# Patient Record
Sex: FEMALE | Race: BLACK OR AFRICAN AMERICAN | NOT HISPANIC OR LATINO | Employment: UNEMPLOYED | URBAN - METROPOLITAN AREA
[De-identification: names, ages, dates, MRNs, and addresses within clinical notes are randomized per-mention and may not be internally consistent; named-entity substitution may affect disease eponyms.]

---

## 2021-05-13 ENCOUNTER — ANNUAL EXAM (OUTPATIENT)
Dept: OBGYN CLINIC | Facility: CLINIC | Age: 41
End: 2021-05-13
Payer: COMMERCIAL

## 2021-05-13 VITALS
HEIGHT: 62 IN | DIASTOLIC BLOOD PRESSURE: 70 MMHG | BODY MASS INDEX: 31.1 KG/M2 | SYSTOLIC BLOOD PRESSURE: 110 MMHG | WEIGHT: 169 LBS

## 2021-05-13 DIAGNOSIS — Z01.411 ENCNTR FOR GYN EXAM (GENERAL) (ROUTINE) W ABNORMAL FINDINGS: Primary | ICD-10-CM

## 2021-05-13 DIAGNOSIS — N92.0 MENORRHAGIA WITH REGULAR CYCLE: ICD-10-CM

## 2021-05-13 DIAGNOSIS — D50.0 IRON DEFICIENCY ANEMIA DUE TO CHRONIC BLOOD LOSS: ICD-10-CM

## 2021-05-13 DIAGNOSIS — Z12.31 ENCOUNTER FOR SCREENING MAMMOGRAM FOR MALIGNANT NEOPLASM OF BREAST: ICD-10-CM

## 2021-05-13 DIAGNOSIS — Z12.4 SCREENING FOR MALIGNANT NEOPLASM OF THE CERVIX: ICD-10-CM

## 2021-05-13 PROCEDURE — 99396 PREV VISIT EST AGE 40-64: CPT | Performed by: OBSTETRICS & GYNECOLOGY

## 2021-05-13 RX ORDER — DIPHENOXYLATE HYDROCHLORIDE AND ATROPINE SULFATE 2.5; .025 MG/1; MG/1
1 TABLET ORAL DAILY
COMMUNITY

## 2021-05-13 RX ORDER — IRON,CARBONYL/ASCORBIC ACID 100-250 MG
TABLET ORAL
COMMUNITY

## 2021-05-13 NOTE — PROGRESS NOTES
Assessment/Plan:     Diagnoses and all orders for this visit:    Encntr for gyn exam (general) (routine) w abnormal findings    Encounter for screening mammogram for malignant neoplasm of breast  -     Mammo screening bilateral w 3d & cad; Future    Menorrhagia with regular cycle  Management with Motrin, Lysteda or contraceptives reviewed  Risks, benefits and indications reviewed  Pt considering Mirena or Christobal Mar  Insertion procedure, R&B and indications reviewed  Iron deficiency anemia due to chronic blood loss  Pt to continue with P O  iron that was Rx'd by her PCP  Other orders  -     Iron-Vitamin C 100-250 MG TABS; Take by mouth  -     multivitamin (THERAGRAN) TABS; Take 1 tablet by mouth daily          Subjective:      Patient ID: Fransisco Labrum is a 36 y o  female  Pt presents for GYN exam and to discuss management of her menorrhagia  Pt not seen by GYN since 2019  Per pt, she was evaluated by PCP few months ago and was informed of being anemic  Pt was advised to start P O  iron  The following portions of the patient's history were reviewed and updated as appropriate: allergies, current medications, past family history, past medical history, past social history, past surgical history and problem list     Review of Systems   Constitutional: Negative  Negative for fatigue  Respiratory: Negative for cough, chest tightness and shortness of breath  Cardiovascular: Negative for chest pain and palpitations  Genitourinary: Negative for dyspareunia, frequency, pelvic pain and vaginal pain  Hematological: Does not bruise/bleed easily  Objective:      /70   Ht 5' 2" (1 575 m)   Wt 76 7 kg (169 lb)   LMP 04/22/2021   BMI 30 91 kg/m²          Physical Exam  Vitals signs and nursing note reviewed  HENT:      Head: Normocephalic  Chest:      Breasts: Breasts are symmetrical          Right: Normal  No bleeding, mass, nipple discharge, skin change or tenderness  Left: Normal  No bleeding, mass, nipple discharge, skin change or tenderness  Abdominal:      General: There is no distension  Palpations: Abdomen is soft  There is no mass  Tenderness: There is no abdominal tenderness  There is no rebound  Genitourinary:     General: Normal vulva  Exam position: Lithotomy position  Labia:         Right: No rash, tenderness or lesion  Left: No rash, tenderness or lesion  Urethra: No urethral pain or urethral lesion  Vagina: Normal  No vaginal discharge  Cervix: No discharge, friability, lesion or erythema  Uterus: Normal        Adnexa: Right adnexa normal and left adnexa normal       Rectum: No external hemorrhoid  Musculoskeletal:      Right lower leg: No edema  Left lower leg: No edema  Lymphadenopathy:      Upper Body:      Right upper body: No axillary or pectoral adenopathy  Left upper body: No axillary or pectoral adenopathy  Skin:     General: Skin is warm  Neurological:      Mental Status: She is alert and oriented to person, place, and time  Psychiatric:         Mood and Affect: Mood normal          Behavior: Behavior normal          Thought Content:  Thought content normal

## 2021-05-14 ENCOUNTER — TELEPHONE (OUTPATIENT)
Dept: OBGYN CLINIC | Facility: CLINIC | Age: 41
End: 2021-05-14

## 2021-05-14 PROBLEM — D50.0 IRON DEFICIENCY ANEMIA DUE TO CHRONIC BLOOD LOSS: Status: ACTIVE | Noted: 2021-05-14

## 2021-05-14 NOTE — TELEPHONE ENCOUNTER
Mirena IUD / Miriwood Brood IUD Insert -- per rep Danis Taylor at [facility], patient is covered 100%, no copay, no deductible  Reference #[id number]

## 2021-05-14 NOTE — TELEPHONE ENCOUNTER
----- Message from Dileep Romero DO sent at 5/13/2021 10:37 AM EDT -----  Regarding: Mirena or Liletta  Please check coverage for Mirena for management of menorrhagia  If Mirena not covered, then check for Liletta  Thank you

## 2021-05-17 LAB
CYTOLOGIST CVX/VAG CYTO: NORMAL
DX ICD CODE: NORMAL
HPV I/H RISK 4 DNA CVX QL PROBE+SIG AMP: NEGATIVE
OTHER STN SPEC: NORMAL
PATH REPORT.FINAL DX SPEC: NORMAL
SL AMB NOTE:: NORMAL
SL AMB SPECIMEN ADEQUACY: NORMAL
SL AMB TEST METHODOLOGY: NORMAL

## 2022-08-15 ENCOUNTER — ANNUAL EXAM (OUTPATIENT)
Dept: OBGYN CLINIC | Facility: CLINIC | Age: 42
End: 2022-08-15

## 2022-08-15 VITALS
DIASTOLIC BLOOD PRESSURE: 80 MMHG | WEIGHT: 197 LBS | BODY MASS INDEX: 36.25 KG/M2 | SYSTOLIC BLOOD PRESSURE: 130 MMHG | HEIGHT: 62 IN

## 2022-08-15 DIAGNOSIS — N92.0 MENORRHAGIA WITH REGULAR CYCLE: ICD-10-CM

## 2022-08-15 DIAGNOSIS — Z12.31 ENCOUNTER FOR SCREENING MAMMOGRAM FOR MALIGNANT NEOPLASM OF BREAST: ICD-10-CM

## 2022-08-15 DIAGNOSIS — Z01.411 ENCNTR FOR GYN EXAM (GENERAL) (ROUTINE) W ABNORMAL FINDINGS: Primary | ICD-10-CM

## 2022-08-15 DIAGNOSIS — N94.10 DYSPAREUNIA IN FEMALE: ICD-10-CM

## 2022-08-17 ENCOUNTER — TELEPHONE (OUTPATIENT)
Dept: OBGYN CLINIC | Facility: CLINIC | Age: 42
End: 2022-08-17

## 2022-08-17 NOTE — TELEPHONE ENCOUNTER
Spoke with Meche at [facility] and provided ICD 10 codes for TV/pelvic US as listed on orders:   N94 10 dyspareunia in female    N92 0 menorrhagia with regular cycle

## 2022-08-30 DIAGNOSIS — Z12.31 ENCOUNTER FOR SCREENING MAMMOGRAM FOR MALIGNANT NEOPLASM OF BREAST: ICD-10-CM

## 2022-09-15 NOTE — PROGRESS NOTES
Annual Wellness Visit  [address], [address], [place] [address] [facility]    ASSESSMENT/PLAN: Gabriella George is a 39 y o   who presents for annual gynecologic exam     Encounter for routine gynecologic examination  - Routine well woman exam completed today  - Cervical Cancer Screening: Current ASCCP Guidelines reviewed  Last Pap: 2021  Next Pap Due: , routine   - STI screening offered including HIV testing: offered, pt declined  - Contraceptive counseling discussed  Current contraception:  S/p b/l T L   - Breast Cancer Screening: Last Mammogram 2022  - The following were reviewed in today's visit: breast self exam    Additional problems addressed during this visit:  1  Encntr for gyn exam (general) (routine) w abnormal findings    2  Encounter for screening mammogram for malignant neoplasm of breast  -     Mammo screening bilateral w 3d & cad; Future    3  Dyspareunia in female  - Unremarkable findings on pelvic exam   Pelvic U/S ordered to further evaluate  -     US pelvis complete w transvaginal; Future; Expected date: 08/15/2022    4  Menorrhagia with regular cycle  -   Normal size uterus on exam   Pelvic U/S ordered to further evaluate  -  Pt to RTO to review U/S results and discuss management options for menorrhagia  -     US pelvis complete w transvaginal; Future; Expected date: 08/15/2022    Next visit: 1 yr for WA, 2 wks after Pelvic U/S    CC:  Annual Gynecologic Examination    HPI: Gabriella George is a 39 y o   who presents for annual gynecologic examination  Patient presents for yearly Gyn exam   Patient states she has regular menses with mod-heavy flow  Patient desires Endometrial ablation, stating one of her family member stopped having menstrual bleeding after Endometrial ablation  Patient has dyspareunia    Concerned that it could be ruptured ovarian cyst due having h/o ruptured ovarian cyst in the past   Family history significant for uterine myomas      Gyn History  Patient's last menstrual period was 2022  Last Pap: 2021 was normal    She  reports being sexually active and has had partner(s) who are male  She reports using the following method of birth control/protection: Female Sterilization  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  No date: Ovarian cyst  No date: Pain in female genitalia on intercourse     Past Surgical History:  No date:  SECTION      Comment:  X2  No date: GYNECOLOGIC CRYOSURGERY  No date: TUBAL LIGATION     Family History   Problem Relation Age of Onset    No Known Problems Sister     No Known Problems Brother     No Known Problems Son     Multiple sclerosis Daughter     Breast cancer Maternal Aunt     No Known Problems Brother     No Known Problems Daughter     Uterine cancer Neg Hx     Ovarian cancer Neg Hx     Colon cancer Neg Hx         Social History     Tobacco Use    Smoking status: Never Smoker    Smokeless tobacco: Never Used   Vaping Use    Vaping Use: Never used   Substance Use Topics    Alcohol use: Yes    Drug use: Never     Comment: no          Current Outpatient Medications:     Iron-Vitamin C 100-250 MG TABS, Take by mouth, Disp: , Rfl:     Misc Natural Products (HOT FLASHEX PO), Take by mouth, Disp: , Rfl:     multivitamin (THERAGRAN) TABS, Take 1 tablet by mouth daily, Disp: , Rfl:     She has No Known Allergies       ROS negative except as noted in HPI    Objective:  /80 (BP Location: Right arm, Patient Position: Sitting, Cuff Size: Standard)   Ht 5' 2" (1 575 m)   Wt 89 4 kg (197 lb)   LMP 2022   BMI 36 03 kg/m²      Physical Exam  Vitals and nursing note reviewed  HENT:      Head: Normocephalic  Chest:   Breasts: Breasts are symmetrical       Right: Normal  No bleeding, mass, nipple discharge, skin change, tenderness or axillary adenopathy        Left: Normal  No bleeding, mass, nipple discharge, skin change, tenderness or axillary adenopathy  Abdominal:      General: There is no distension  Palpations: Abdomen is soft  There is no mass  Tenderness: There is no abdominal tenderness  There is no rebound  Genitourinary:     General: Normal vulva  Exam position: Lithotomy position  Labia:         Right: No rash, tenderness or lesion  Left: No rash, tenderness or lesion  Urethra: No urethral pain or urethral lesion  Vagina: Normal  No vaginal discharge  Cervix: No discharge, friability, lesion or erythema  Uterus: Normal        Adnexa: Right adnexa normal and left adnexa normal       Rectum: No external hemorrhoid  Musculoskeletal:      Right lower leg: No edema  Left lower leg: No edema  Lymphadenopathy:      Upper Body:      Right upper body: No axillary or pectoral adenopathy  Left upper body: No axillary or pectoral adenopathy  Skin:     General: Skin is warm  Neurological:      Mental Status: She is alert and oriented to person, place, and time  Psychiatric:         Mood and Affect: Mood normal          Behavior: Behavior normal          Thought Content:  Thought content normal

## 2022-09-19 ENCOUNTER — OFFICE VISIT (OUTPATIENT)
Dept: OBGYN CLINIC | Facility: CLINIC | Age: 42
End: 2022-09-19

## 2022-09-19 VITALS
WEIGHT: 194.6 LBS | HEIGHT: 62 IN | SYSTOLIC BLOOD PRESSURE: 132 MMHG | BODY MASS INDEX: 35.81 KG/M2 | DIASTOLIC BLOOD PRESSURE: 80 MMHG

## 2022-09-19 DIAGNOSIS — N92.0 MENORRHAGIA WITH REGULAR CYCLE: Primary | ICD-10-CM

## 2022-09-19 DIAGNOSIS — D25.0 SUBMUCOUS MYOMA OF UTERUS: ICD-10-CM

## 2022-09-19 RX ORDER — TRANEXAMIC ACID 650 MG/1
1300 TABLET ORAL 3 TIMES DAILY
Qty: 30 TABLET | Refills: 11 | Status: SHIPPED | OUTPATIENT
Start: 2022-09-19 | End: 2022-09-24

## 2022-09-20 NOTE — PROGRESS NOTES
Assessment/Plan:      Diagnoses and all orders for this visit:    Menorrhagia with regular cycle  -  Management of heavy menstrual flow with Motrin, Lysteda or hormonal options reviewed  Patient declined hormonal options  Desires to take Lysteda PRN  -     Tranexamic Acid 650 MG TABS; Take 2 tablets (1,300 mg total) by mouth 3 (three) times a day for 5 days    Submucous myoma of uterus         -  Surgical management of submucosal myoma recommended  Patient desires to observe menstrual flow with Lysteda prior to considering surgical management     -  Advised patient to RTO after taking Lysteda with next menses  Subjective:     Patient ID: Mckenna Johnson is a 39 y o  female  Patient presents to review U/S results  Patient with h/o menorrhagia x 2 years, states menses are regular with heavy flow lasting for 1-2 days  Patient with dysmenorrhea on days with heavy menstrual flow  Review of Systems   Constitutional: Negative for activity change and unexpected weight change  Genitourinary: Positive for menstrual problem  Negative for dyspareunia, frequency, pelvic pain, vaginal bleeding, vaginal discharge and vaginal pain           Objective:     Physical Exam

## 2022-10-26 ENCOUNTER — OFFICE VISIT (OUTPATIENT)
Dept: OBGYN CLINIC | Facility: CLINIC | Age: 42
End: 2022-10-26

## 2022-10-26 VITALS
DIASTOLIC BLOOD PRESSURE: 80 MMHG | BODY MASS INDEX: 36.33 KG/M2 | WEIGHT: 197.4 LBS | HEIGHT: 62 IN | SYSTOLIC BLOOD PRESSURE: 128 MMHG

## 2022-10-26 DIAGNOSIS — D25.0 SUBMUCOUS MYOMA OF UTERUS: Primary | ICD-10-CM

## 2022-10-26 DIAGNOSIS — N92.0 MENORRHAGIA WITH REGULAR CYCLE: ICD-10-CM

## 2022-10-28 ENCOUNTER — APPOINTMENT (OUTPATIENT)
Dept: LAB | Facility: HOSPITAL | Age: 42
End: 2022-10-28
Attending: OBSTETRICS & GYNECOLOGY
Payer: COMMERCIAL

## 2022-10-28 DIAGNOSIS — Z01.818 ENCOUNTER FOR PREADMISSION TESTING: ICD-10-CM

## 2022-10-28 LAB
ABO GROUP BLD: NORMAL
ANION GAP SERPL CALCULATED.3IONS-SCNC: 5 MMOL/L (ref 4–13)
BACTERIA UR QL AUTO: ABNORMAL /HPF
BASOPHILS # BLD AUTO: 0.03 THOUSANDS/ÂΜL (ref 0–0.1)
BASOPHILS NFR BLD AUTO: 0 % (ref 0–1)
BILIRUB UR QL STRIP: NEGATIVE
BLD GP AB SCN SERPL QL: NEGATIVE
BUN SERPL-MCNC: 11 MG/DL (ref 5–25)
CALCIUM SERPL-MCNC: 8.9 MG/DL (ref 8.3–10.1)
CHLORIDE SERPL-SCNC: 105 MMOL/L (ref 96–108)
CLARITY UR: CLEAR
CO2 SERPL-SCNC: 27 MMOL/L (ref 21–32)
COLOR UR: YELLOW
CREAT SERPL-MCNC: 0.94 MG/DL (ref 0.6–1.3)
EOSINOPHIL # BLD AUTO: 0.18 THOUSAND/ÂΜL (ref 0–0.61)
EOSINOPHIL NFR BLD AUTO: 2 % (ref 0–6)
ERYTHROCYTE [DISTWIDTH] IN BLOOD BY AUTOMATED COUNT: 12.5 % (ref 11.6–15.1)
GFR SERPL CREATININE-BSD FRML MDRD: 75 ML/MIN/1.73SQ M
GLUCOSE P FAST SERPL-MCNC: 68 MG/DL (ref 65–99)
GLUCOSE UR STRIP-MCNC: NEGATIVE MG/DL
HCT VFR BLD AUTO: 44 % (ref 34.8–46.1)
HGB BLD-MCNC: 13.9 G/DL (ref 11.5–15.4)
HGB UR QL STRIP.AUTO: NEGATIVE
IMM GRANULOCYTES # BLD AUTO: 0.03 THOUSAND/UL (ref 0–0.2)
IMM GRANULOCYTES NFR BLD AUTO: 0 % (ref 0–2)
KETONES UR STRIP-MCNC: NEGATIVE MG/DL
LEUKOCYTE ESTERASE UR QL STRIP: NEGATIVE
LYMPHOCYTES # BLD AUTO: 2.43 THOUSANDS/ÂΜL (ref 0.6–4.47)
LYMPHOCYTES NFR BLD AUTO: 24 % (ref 14–44)
MCH RBC QN AUTO: 28.6 PG (ref 26.8–34.3)
MCHC RBC AUTO-ENTMCNC: 31.6 G/DL (ref 31.4–37.4)
MCV RBC AUTO: 91 FL (ref 82–98)
MONOCYTES # BLD AUTO: 0.71 THOUSAND/ÂΜL (ref 0.17–1.22)
MONOCYTES NFR BLD AUTO: 7 % (ref 4–12)
MUCOUS THREADS UR QL AUTO: ABNORMAL
NEUTROPHILS # BLD AUTO: 6.59 THOUSANDS/ÂΜL (ref 1.85–7.62)
NEUTS SEG NFR BLD AUTO: 67 % (ref 43–75)
NITRITE UR QL STRIP: NEGATIVE
NON-SQ EPI CELLS URNS QL MICRO: ABNORMAL /HPF
NRBC BLD AUTO-RTO: 0 /100 WBCS
PH UR STRIP.AUTO: 7.5 [PH]
PLATELET # BLD AUTO: 235 THOUSANDS/UL (ref 149–390)
PMV BLD AUTO: 11.9 FL (ref 8.9–12.7)
POTASSIUM SERPL-SCNC: 4 MMOL/L (ref 3.5–5.3)
PROT UR STRIP-MCNC: NEGATIVE MG/DL
RBC # BLD AUTO: 4.86 MILLION/UL (ref 3.81–5.12)
RBC #/AREA URNS AUTO: ABNORMAL /HPF
RH BLD: POSITIVE
SODIUM SERPL-SCNC: 137 MMOL/L (ref 135–147)
SP GR UR STRIP.AUTO: 1.01 (ref 1–1.03)
SPECIMEN EXPIRATION DATE: NORMAL
UROBILINOGEN UR STRIP-ACNC: <2 MG/DL
WBC # BLD AUTO: 9.97 THOUSAND/UL (ref 4.31–10.16)
WBC #/AREA URNS AUTO: ABNORMAL /HPF

## 2022-10-28 PROCEDURE — 81001 URINALYSIS AUTO W/SCOPE: CPT

## 2022-10-28 PROCEDURE — 86900 BLOOD TYPING SEROLOGIC ABO: CPT

## 2022-10-28 PROCEDURE — 80048 BASIC METABOLIC PNL TOTAL CA: CPT

## 2022-10-28 PROCEDURE — 85025 COMPLETE CBC W/AUTO DIFF WBC: CPT

## 2022-10-28 PROCEDURE — 86850 RBC ANTIBODY SCREEN: CPT

## 2022-10-28 PROCEDURE — 86901 BLOOD TYPING SEROLOGIC RH(D): CPT

## 2022-10-28 PROCEDURE — 36415 COLL VENOUS BLD VENIPUNCTURE: CPT

## 2022-10-28 PROCEDURE — 87086 URINE CULTURE/COLONY COUNT: CPT

## 2022-10-29 LAB — BACTERIA UR CULT: NORMAL

## 2022-11-02 RX ORDER — TRANEXAMIC ACID 650 MG/1
TABLET ORAL
COMMUNITY
Start: 2022-10-27

## 2022-11-02 NOTE — PRE-PROCEDURE INSTRUCTIONS
Pre-Surgery Instructions:   Medication Instructions   • Iron-Vitamin C 100-250 MG TABS Stop taking 3 days prior to surgery  • Misc Natural Products (HOT FLASHEX PO) Stop taking 3 days prior to surgery  • multivitamin (THERAGRAN) TABS Stop taking 3 days prior to surgery     • Tranexamic Acid 650 MG TABS does not take on DOS

## 2022-11-03 ENCOUNTER — ANESTHESIA EVENT (OUTPATIENT)
Dept: PERIOP | Facility: HOSPITAL | Age: 42
End: 2022-11-03

## 2022-11-03 NOTE — PROGRESS NOTES
History and Physical  [address]   [address], [address], [place] [facility] [address] [address]    Assessment/Plan:  1  Submucous myoma of uterus      -  management of submucosal myoma via D&C, hysteroscopy and myomectomy or hysterectomy reviewed  R&B, indications and procedure discussed  Patient declines to proceed with Hysterectomy  Desires to proceed with D&C, hysteroscopy an myomectomy via Symphion tissue removal system  H&P done and informed consent obtained    2  Menorrhagia with regular cycle      -  Patient to continue with Lysteda PRN     Subjective:   Joi Conrad is a 39 y o   female  Patient states her menstrual bleeding has significantly decreased with Lysteda but she desires surgical management of uterine fibroids and observe if menstrual bleeding decreases    CC: Desires surgery for fibroids    HPI:   Patient with history of menorrhagia with regular cycles  Declined management with OCPs or other hormonal options  Pelvic U/S significant for two submucosal myomas  Patient presents to discuss surgical management options for uterine fibroids  Patient states her menstrual bleeding has decreased wit Lysteda      Gyn History  Patient's last menstrual period was 10/12/2022 (exact date)  Last pap smear: 2021    She  reports being sexually active and has had partner(s) who are male  She reports using the following method of birth control/protection: Female Sterilization         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  No date: Ovarian cyst  No date: Pain in female genitalia on intercourse     Past Surgical History:  No date:  SECTION      Comment:  X2  No date: GYNECOLOGIC CRYOSURGERY  No date: TUBAL LIGATION     Social History     Tobacco Use   • Smoking status: Never Smoker   • Smokeless tobacco: Never Used   Vaping Use   • Vaping Use: Never used   Substance Use Topics   • Alcohol use: Yes     Comment: occasional   • Drug use: Never     Comment: no          Current Outpatient Medications:   •  Iron-Vitamin C 100-250 MG TABS, Take by mouth, Disp: , Rfl:   •  Misc Natural Products (HOT FLASHEX PO), Take by mouth, Disp: , Rfl:   •  multivitamin (THERAGRAN) TABS, Take 1 tablet by mouth daily, Disp: , Rfl:   •  Tranexamic Acid 650 MG TABS, , Disp: , Rfl:     She has No Known Allergies       ROS: Review of Systems   Constitutional: Negative for activity change and unexpected weight change  Genitourinary: Negative for difficulty urinating, dyspareunia, dysuria, menstrual problem, pelvic pain, vaginal bleeding, vaginal discharge and vaginal pain  Objective:  /80 (BP Location: Left arm, Patient Position: Sitting, Cuff Size: Standard)   Ht 5' 2" (1 575 m)   Wt 89 5 kg (197 lb 6 4 oz)   LMP 10/12/2022 (Exact Date)   BMI 36 10 kg/m²     Physical exam:  Gen: alert, no acute distress  Cards: regular rate  Resp: unlabored breathing  Abdomen: soft, non-tender, non-distended  Extremities: non-tender, no edema     Physical Exam  Vitals and nursing note reviewed  HENT:      Head: Normocephalic  Abdominal:      General: There is no distension  Palpations: Abdomen is soft  There is no mass  Tenderness: There is no abdominal tenderness  There is no rebound  Genitourinary:     General: Normal vulva  Exam position: Lithotomy position  Labia:         Right: No rash, tenderness or lesion  Left: No rash, tenderness or lesion  Urethra: No urethral pain or urethral lesion  Vagina: Normal  No vaginal discharge  Cervix: No cervical motion tenderness, lesion or erythema  Uterus: Normal        Adnexa: Right adnexa normal and left adnexa normal       Rectum: No external hemorrhoid  Musculoskeletal:      Right lower leg: No edema  Left lower leg: No edema  Skin:     General: Skin is warm     Neurological:      Mental Status: She is alert and oriented to person, place, and time  Psychiatric:         Mood and Affect: Mood normal          Behavior: Behavior normal          Thought Content:  Thought content normal

## 2022-11-03 NOTE — H&P (VIEW-ONLY)
History and Physical  [address]   [address], [address], [place] [address]    Assessment/Plan:  1  Submucous myoma of uterus      -  management of submucosal myoma via D&C, hysteroscopy and myomectomy or hysterectomy reviewed  R&B, indications and procedure discussed  Patient declines to proceed with Hysterectomy  Desires to proceed with D&C, hysteroscopy an myomectomy via Symphion tissue removal system  H&P done and informed consent obtained    2  Menorrhagia with regular cycle      -  Patient to continue with Lysteda PRN     Subjective:   Shahab Foy is a 39 y o   female  Patient states her menstrual bleeding has significantly decreased with Lysteda but she desires surgical management of uterine fibroids and observe if menstrual bleeding decreases    CC: Desires surgery for fibroids    HPI:   Patient with history of menorrhagia with regular cycles  Declined management with OCPs or other hormonal options  Pelvic U/S significant for two submucosal myomas  Patient presents to discuss surgical management options for uterine fibroids  Patient states her menstrual bleeding has decreased wit Lysteda      Gyn History  Patient's last menstrual period was 10/12/2022 (exact date)  Last pap smear: 2021    She  reports being sexually active and has had partner(s) who are male  She reports using the following method of birth control/protection: Female Sterilization         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  No date: Ovarian cyst  No date: Pain in female genitalia on intercourse     Past Surgical History:  No date:  SECTION      Comment:  X2  No date: GYNECOLOGIC CRYOSURGERY  No date: TUBAL LIGATION     Social History     Tobacco Use   • Smoking status: Never Smoker   • Smokeless tobacco: Never Used   Vaping Use   • Vaping Use: Never used   Substance Use Topics   • Alcohol use: Yes     Comment: occasional   • Drug use: Never     Comment: no          Current Outpatient Medications:   •  Iron-Vitamin C 100-250 MG TABS, Take by mouth, Disp: , Rfl:   •  Misc Natural Products (HOT FLASHEX PO), Take by mouth, Disp: , Rfl:   •  multivitamin (THERAGRAN) TABS, Take 1 tablet by mouth daily, Disp: , Rfl:   •  Tranexamic Acid 650 MG TABS, , Disp: , Rfl:     She has No Known Allergies       ROS: Review of Systems   Constitutional: Negative for activity change and unexpected weight change  Genitourinary: Negative for difficulty urinating, dyspareunia, dysuria, menstrual problem, pelvic pain, vaginal bleeding, vaginal discharge and vaginal pain  Objective:  /80 (BP Location: Left arm, Patient Position: Sitting, Cuff Size: Standard)   Ht 5' 2" (1 575 m)   Wt 89 5 kg (197 lb 6 4 oz)   LMP 10/12/2022 (Exact Date)   BMI 36 10 kg/m²     Physical exam:  Gen: alert, no acute distress  Cards: regular rate  Resp: unlabored breathing  Abdomen: soft, non-tender, non-distended  Extremities: non-tender, no edema     Physical Exam  Vitals and nursing note reviewed  HENT:      Head: Normocephalic  Abdominal:      General: There is no distension  Palpations: Abdomen is soft  There is no mass  Tenderness: There is no abdominal tenderness  There is no rebound  Genitourinary:     General: Normal vulva  Exam position: Lithotomy position  Labia:         Right: No rash, tenderness or lesion  Left: No rash, tenderness or lesion  Urethra: No urethral pain or urethral lesion  Vagina: Normal  No vaginal discharge  Cervix: No cervical motion tenderness, lesion or erythema  Uterus: Normal        Adnexa: Right adnexa normal and left adnexa normal       Rectum: No external hemorrhoid  Musculoskeletal:      Right lower leg: No edema  Left lower leg: No edema  Skin:     General: Skin is warm     Neurological:      Mental Status: She is alert and oriented to person, place, and time  Psychiatric:         Mood and Affect: Mood normal          Behavior: Behavior normal          Thought Content:  Thought content normal

## 2022-11-04 ENCOUNTER — HOSPITAL ENCOUNTER (OUTPATIENT)
Facility: HOSPITAL | Age: 42
Setting detail: OUTPATIENT SURGERY
Discharge: HOME | End: 2022-11-04
Attending: OBSTETRICS & GYNECOLOGY | Admitting: OBSTETRICS & GYNECOLOGY

## 2022-11-04 ENCOUNTER — ANESTHESIA (OUTPATIENT)
Dept: PERIOP | Facility: HOSPITAL | Age: 42
End: 2022-11-04

## 2022-11-04 VITALS
HEIGHT: 62 IN | DIASTOLIC BLOOD PRESSURE: 86 MMHG | RESPIRATION RATE: 18 BRPM | TEMPERATURE: 98 F | WEIGHT: 197 LBS | BODY MASS INDEX: 36.25 KG/M2 | OXYGEN SATURATION: 100 % | SYSTOLIC BLOOD PRESSURE: 143 MMHG | HEART RATE: 68 BPM

## 2022-11-04 DIAGNOSIS — D25.0 SUBMUCOUS MYOMA OF UTERUS: ICD-10-CM

## 2022-11-04 DIAGNOSIS — N92.1 MENORRHAGIA WITH IRREGULAR CYCLE: ICD-10-CM

## 2022-11-04 LAB
EXT PREGNANCY TEST URINE: NEGATIVE
EXT. CONTROL: NORMAL

## 2022-11-04 RX ORDER — FENTANYL CITRATE 50 UG/ML
INJECTION, SOLUTION INTRAMUSCULAR; INTRAVENOUS AS NEEDED
Status: DISCONTINUED | OUTPATIENT
Start: 2022-11-04 | End: 2022-11-04

## 2022-11-04 RX ORDER — PROPOFOL 10 MG/ML
INJECTION, EMULSION INTRAVENOUS AS NEEDED
Status: DISCONTINUED | OUTPATIENT
Start: 2022-11-04 | End: 2022-11-04

## 2022-11-04 RX ORDER — LIDOCAINE HYDROCHLORIDE 10 MG/ML
INJECTION, SOLUTION EPIDURAL; INFILTRATION; INTRACAUDAL; PERINEURAL AS NEEDED
Status: DISCONTINUED | OUTPATIENT
Start: 2022-11-04 | End: 2022-11-04

## 2022-11-04 RX ORDER — PROPOFOL 10 MG/ML
INJECTION, EMULSION INTRAVENOUS CONTINUOUS PRN
Status: DISCONTINUED | OUTPATIENT
Start: 2022-11-04 | End: 2022-11-04

## 2022-11-04 RX ORDER — ONDANSETRON 2 MG/ML
4 INJECTION INTRAMUSCULAR; INTRAVENOUS ONCE AS NEEDED
Status: DISCONTINUED | OUTPATIENT
Start: 2022-11-04 | End: 2022-11-04 | Stop reason: HOSPADM

## 2022-11-04 RX ORDER — KETOROLAC TROMETHAMINE 30 MG/ML
INJECTION, SOLUTION INTRAMUSCULAR; INTRAVENOUS AS NEEDED
Status: DISCONTINUED | OUTPATIENT
Start: 2022-11-04 | End: 2022-11-04

## 2022-11-04 RX ORDER — LIDOCAINE HYDROCHLORIDE 10 MG/ML
0.5 INJECTION, SOLUTION EPIDURAL; INFILTRATION; INTRACAUDAL; PERINEURAL ONCE AS NEEDED
Status: DISCONTINUED | OUTPATIENT
Start: 2022-11-04 | End: 2022-11-04 | Stop reason: HOSPADM

## 2022-11-04 RX ORDER — MIDAZOLAM HYDROCHLORIDE 2 MG/2ML
INJECTION, SOLUTION INTRAMUSCULAR; INTRAVENOUS AS NEEDED
Status: DISCONTINUED | OUTPATIENT
Start: 2022-11-04 | End: 2022-11-04

## 2022-11-04 RX ORDER — FENTANYL CITRATE/PF 50 MCG/ML
50 SYRINGE (ML) INJECTION
Status: DISCONTINUED | OUTPATIENT
Start: 2022-11-04 | End: 2022-11-04 | Stop reason: HOSPADM

## 2022-11-04 RX ORDER — METOCLOPRAMIDE HYDROCHLORIDE 5 MG/ML
10 INJECTION INTRAMUSCULAR; INTRAVENOUS ONCE AS NEEDED
Status: DISCONTINUED | OUTPATIENT
Start: 2022-11-04 | End: 2022-11-04 | Stop reason: HOSPADM

## 2022-11-04 RX ORDER — SODIUM CHLORIDE, SODIUM LACTATE, POTASSIUM CHLORIDE, CALCIUM CHLORIDE 600; 310; 30; 20 MG/100ML; MG/100ML; MG/100ML; MG/100ML
125 INJECTION, SOLUTION INTRAVENOUS CONTINUOUS
Status: DISCONTINUED | OUTPATIENT
Start: 2022-11-04 | End: 2022-11-04 | Stop reason: HOSPADM

## 2022-11-04 RX ORDER — LIDOCAINE HYDROCHLORIDE 10 MG/ML
INJECTION, SOLUTION EPIDURAL; INFILTRATION; INTRACAUDAL; PERINEURAL AS NEEDED
Status: DISCONTINUED | OUTPATIENT
Start: 2022-11-04 | End: 2022-11-04 | Stop reason: HOSPADM

## 2022-11-04 RX ADMIN — MIDAZOLAM 2 MG: 1 INJECTION INTRAMUSCULAR; INTRAVENOUS at 08:26

## 2022-11-04 RX ADMIN — FENTANYL CITRATE 25 MCG: 50 INJECTION, SOLUTION INTRAMUSCULAR; INTRAVENOUS at 08:47

## 2022-11-04 RX ADMIN — PROPOFOL 20 MG: 10 INJECTION, EMULSION INTRAVENOUS at 08:45

## 2022-11-04 RX ADMIN — SODIUM CHLORIDE, SODIUM LACTATE, POTASSIUM CHLORIDE, AND CALCIUM CHLORIDE: .6; .31; .03; .02 INJECTION, SOLUTION INTRAVENOUS at 08:28

## 2022-11-04 RX ADMIN — PROPOFOL 20 MG: 10 INJECTION, EMULSION INTRAVENOUS at 08:44

## 2022-11-04 RX ADMIN — KETOROLAC TROMETHAMINE 30 MG: 30 INJECTION, SOLUTION INTRAMUSCULAR; INTRAVENOUS at 08:58

## 2022-11-04 RX ADMIN — LIDOCAINE HYDROCHLORIDE 50 MG: 10 INJECTION, SOLUTION EPIDURAL; INFILTRATION; INTRACAUDAL; PERINEURAL at 08:34

## 2022-11-04 RX ADMIN — PROPOFOL 100 MCG/KG/MIN: 10 INJECTION, EMULSION INTRAVENOUS at 08:34

## 2022-11-04 RX ADMIN — FENTANYL CITRATE 25 MCG: 50 INJECTION, SOLUTION INTRAMUSCULAR; INTRAVENOUS at 08:42

## 2022-11-04 RX ADMIN — PROPOFOL 50 MG: 10 INJECTION, EMULSION INTRAVENOUS at 08:34

## 2022-11-04 NOTE — ANESTHESIA POSTPROCEDURE EVALUATION
Post-Op Assessment Note    CV Status:  Stable    Pain management: adequate     Mental Status:  Alert and awake   Hydration Status:  Euvolemic   PONV Controlled:  Controlled   Airway Patency:  Patent      Post Op Vitals Reviewed: Yes      Staff: CRNA         No complications documented      BP   108/61   Temp   98 8   Pulse  95   Resp   20   SpO2   100

## 2022-11-04 NOTE — OP NOTE
OPERATIVE REPORT  PATIENT NAME: Evan Killian    :  1980  MRN: [id number]  Pt Location: [facility] OR ROOM 03    SURGERY DATE: 2022    Surgeon(s) and Role:     DO Pretty Keyes Primary    Preop Diagnosis:  Submucous myoma of uterus [D25 0]  Menorrhagia with regular cycle [N92 1]    Post-Op Diagnosis Codes:     * Endometrial polyp     * Menorrhagia with regular cycle [N92 1]    Procedure(s) (LRB):  DILATATION AND CURETTAGE (D&C) WITH HYSTEROSCOPY (N/A) and polypectomy    Specimen(s):  Endometrial polyp  Endometrial curetting     Estimated Blood Loss:   Minimal    Drains:  none    Anesthesia Type:   IV Sedation with Anesthesia    Operative Indications:  Submucous myoma of uterus [D25 0]  Menorrhagia with regular cycle [N92 1]    Operative Findings:  Normal size uterus  B/L ostia visible  No submucosal myoma(s) noted  Normal appearing endometrium with one endometrial polyp noted     Complications:   None    Procedure and Technique:    The patient was taken to the operating room where anesthesia was administered  She was prepped and draped in the usual sterile fashion in the dorsal lithotomy position  A patient safety time-out was performed with all members of the operating room team present  Examination under anesthesia revealed findings as noted above  A bivalve speculum was placed to allow for good visualization of the cervix  A tenaculum was then placed on the anterior lip of the cervix  Paracervical block was obtained via 10 ml of 1% lidocaine  The uterus was sounded to a depth of 8 cm and then dilated to accommodate a #24 Michelle dilator  A hysteroscope was then introduced into the endocervical canal and crystalloid solution distending medium was turned on  The hysteroscope was advanced to the fundus and the diagnostic findings were as noted above  The hysteroscope was then removed and endometrial polyp was removed via polyp forceps    A thorough curettage of the endometrial canal was done to obtain endometrial curetting  Hysteroscope was reintroduced into the endometrium via endocervical canal and no endometrial polyp was noted  Upon completion, the tenaculum was removed from the anterior lip of the cervix and good hemostasis was noted  All instruments were removed from the vagina  Sponge, needle and instrument counts were correct  The patient tolerated the procedure well and was taken to the recovery room in stable condition      Patient Disposition:  PACU     SIGNATURE: Jaja Becker DO  DATE: November 4, 2022  TIME: 9:00 AM

## 2022-11-04 NOTE — ANESTHESIA PREPROCEDURE EVALUATION
Procedure:  DILATATION AND CURETTAGE (D&C) WITH HYSTEROSCOPY, MYOMECTOMY (N/A Uterus)  MYOMECTOMY (N/A Uterus)    Relevant Problems   HEMATOLOGY   (+) Iron deficiency anemia due to chronic blood loss        Physical Exam    Airway    Mallampati score: II  TM Distance: >3 FB  Neck ROM: full     Dental       Cardiovascular      Pulmonary      Other Findings        Anesthesia Plan  ASA Score- 2     Anesthesia Type- IV sedation with anesthesia with ASA Monitors  Additional Monitors:   Airway Plan:           Plan Factors-    Chart reviewed  Induction- intravenous  Postoperative Plan-     Informed Consent- Anesthetic plan and risks discussed with patient  I personally reviewed this patient with the CRNA  Discussed and agreed on the Anesthesia Plan with the CRNA  George Kimbrough

## 2022-11-04 NOTE — INTERVAL H&P NOTE
H&P reviewed  After examining the patient I find no changes in the patients condition since the H&P had been written      Vitals:    11/04/22 0733   BP: 128/86   Pulse: 78   Resp: 18   Temp: 98 8 °F (37 1 °C)   SpO2: 96%

## 2022-12-14 ENCOUNTER — OFFICE VISIT (OUTPATIENT)
Dept: OBGYN CLINIC | Facility: CLINIC | Age: 42
End: 2022-12-14

## 2022-12-14 VITALS
DIASTOLIC BLOOD PRESSURE: 74 MMHG | WEIGHT: 196.4 LBS | HEIGHT: 60 IN | SYSTOLIC BLOOD PRESSURE: 122 MMHG | BODY MASS INDEX: 38.56 KG/M2

## 2022-12-14 DIAGNOSIS — N92.0 MENORRHAGIA WITH REGULAR CYCLE: Primary | ICD-10-CM

## 2022-12-14 DIAGNOSIS — Z09 POSTOP CHECK: ICD-10-CM

## 2022-12-14 RX ORDER — TRANEXAMIC ACID 650 MG/1
1300 TABLET ORAL 3 TIMES DAILY
Qty: 30 TABLET | Refills: 11 | Status: SHIPPED | OUTPATIENT
Start: 2022-12-14 | End: 2022-12-19

## 2022-12-14 RX ORDER — IBUPROFEN 600 MG/1
600 TABLET ORAL EVERY 8 HOURS PRN
COMMUNITY
Start: 2022-09-22

## 2023-02-20 ENCOUNTER — PROCEDURE VISIT (OUTPATIENT)
Dept: OBGYN CLINIC | Facility: CLINIC | Age: 43
End: 2023-02-20

## 2023-02-20 VITALS
SYSTOLIC BLOOD PRESSURE: 128 MMHG | BODY MASS INDEX: 38.44 KG/M2 | DIASTOLIC BLOOD PRESSURE: 80 MMHG | WEIGHT: 195.8 LBS | HEIGHT: 60 IN

## 2023-02-20 DIAGNOSIS — Z01.812 PRE-PROCEDURE LAB EXAM: ICD-10-CM

## 2023-02-20 DIAGNOSIS — Z30.430 ENCOUNTER FOR INSERTION OF PROGESTIN-RELEASING INTRAUTERINE CONTRACEPTIVE DEVICE (IUD): Primary | ICD-10-CM

## 2023-02-20 LAB — SL AMB POCT URINE HCG: NEGATIVE

## 2023-02-20 NOTE — PATIENT INSTRUCTIONS
IUD inserted as requested  Spotty irregular bleeding may persist up to 6 months  Use backup method of birth control ×7 days  Always condom use for STI prevention  Return to office in 4 weeks after next menses  Call office with any bright red or excessive bleeding, fever, severe pelvic pain or foul discharge  Check string monthly after menses  May repeat motrin dose 4 hours from last dose with food

## 2023-02-20 NOTE — PROGRESS NOTES
Here for Mirena insertion for menorrhagia  Iud insertions    Date/Time: 2/20/2023 1:11 PM  Performed by: WEN Mcadams  Authorized by: WEN Mcadams     Other Assisting Provider: Yes (comment)    Verbal consent obtained?: Yes    Risks and benefits: Risks, benefits and alternatives were discussed    Consent given by:  Patient  Patient states understanding of procedure being performed: Yes    Patient identity confirmed:  Verbally with patient  Procedure:     Pelvic exam performed: yes      Negative urine pregnancy test: yes      Cervix cleaned and prepped: yes      Speculum placed in vagina: yes      Tenaculum applied to cervix: yes      Uterus sounded: yes      Uterus sound depth (cm):  8    IUD inserted with no complications: yes      IUD type:  Mirena    Strings trimmed: yes    Post-procedure:     Patient tolerated procedure well: yes      Patient will follow up after next period: yes    Comments:       IUD inserted as requested  Spotty irregular bleeding may persist up to 6 months  Use backup method of birth control ×7 days  Always condom use for STI prevention  Return to office in 4 weeks after next menses  Call office with any bright red or excessive bleeding, fever, severe pelvic pain or foul discharge  Check string monthly after menses  May repeat motrin dose 4 hours from last dose with food

## 2023-03-20 ENCOUNTER — OFFICE VISIT (OUTPATIENT)
Dept: OBGYN CLINIC | Facility: CLINIC | Age: 43
End: 2023-03-20

## 2023-03-20 VITALS
BODY MASS INDEX: 36.63 KG/M2 | HEIGHT: 61 IN | WEIGHT: 194 LBS | DIASTOLIC BLOOD PRESSURE: 78 MMHG | SYSTOLIC BLOOD PRESSURE: 110 MMHG

## 2023-03-20 DIAGNOSIS — Z30.431 IUD CHECK UP: Primary | ICD-10-CM

## 2023-03-20 NOTE — PROGRESS NOTES
Assessment/Plan:    Mirena IUD in place  Check string monthly after menses  Call with any concerns  Irregular vaginal bleeding may persist up to 6 months      Diagnoses and all orders for this visit:    IUD check up    Other orders  -     Levonorgestrel (MIRENA) 20 MCG/DAY IUD; 1 each by Intrauterine route once          Subjective:      Patient ID: Kaitlin Goddard is a 43 y o  female  Here for IUD check Mirena inserted 2/20/2023 C/o frequent bleeding and cramping Felt like she had a period for a month LMP 3/9/2023 lasted 9 days bit already lighter did not need the Lysteda  SA no issues Cramping has lessened some days ok others will have some cramping       The following portions of the patient's history were reviewed and updated as appropriate: allergies, current medications, past family history, past medical history, past social history, past surgical history and problem list     Review of Systems   Gastrointestinal: Negative for abdominal pain  Genitourinary: Negative for dyspareunia, menstrual problem, pelvic pain, vaginal bleeding and vaginal discharge  Neurological: Negative for headaches  Psychiatric/Behavioral: Negative for dysphoric mood  The patient is not nervous/anxious  Objective:      /78   Ht 5' 0 5" (1 537 m)   Wt 88 kg (194 lb)   LMP 03/09/2023 (Exact Date)   Breastfeeding No   BMI 37 26 kg/m²          Physical Exam  Vitals and nursing note reviewed  Constitutional:       Appearance: Normal appearance  Abdominal:      Palpations: Abdomen is soft  Tenderness: There is no abdominal tenderness  Genitourinary:     General: Normal vulva  Exam position: Lithotomy position  Vagina: Normal       Cervix: No cervical motion tenderness or cervical bleeding  Uterus: Normal        Comments: IUD string noted at os  Neurological:      Mental Status: She is alert and oriented to person, place, and time     Psychiatric:         Mood and Affect: Mood normal

## 2023-05-03 NOTE — PROGRESS NOTES
Anticoagulation Progress Note    Indication: AF  Goal INR: 2.0-3.0  Duration: indefinite  Referring Provider: Dejah Solomon DO (Frida Beard DO)  Supervising Provider: Cydney Mancilla MD  Order Expiration Date: 24  Pertinent History: pt previously took eliquis, stopped r/t cost; PMHx: HTN, DM2, memory loss    AVS printed for patient per patient request-advised to bring folder at each visit.  She did today which contains her patient education and all her previous dosing sheets    Assessment   Yes No   []  [x] Missed doses:    []  [x] Extra doses:    []  [x] Significant medication changes (RX, OTC, Herbal): pt stated she intermittently takes advil  voltaren gel PRN, -no recent use   [x]  [] High-risk maintenance medications: voltaren PRN             []  [x] Vitamin K / dietary changes (Vitamin K goal: 7 cups/week):  Has been avoiding.  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 Planning a trip to the [facility] in May 2023 for a family wedding - going for 2-3 months- hoping to retire there soon.     Anticoagulation Summary  As of 5/3/2023    INR goal:  2.0-3.0   TTR:  23.8 % (2.3 wk)   INR used for dosin.2 (5/3/2023)   Full warfarin instructions:  5/3: 3 mg; 5/4: 1.5 mg; 5/5: 1.5 mg; 5/6: 1.5 mg; 5/7: 1.5 mg   Next INR check:  2023    Indications    Permanent atrial fibrillation (CMD) [I48.21]             Anticoagulation Episode Summary     Send INR reminders to:  [facility] [address]      Anticoagulation Care Providers     Provider Role Specialty Phone number    Cydney Mancilla MD Responsible Family Practice [phone]    Frida Beard MD Responsible [facility] [phone]        Plan (3 mg tablets and 6 mg tablets)- she brought her pills  Assessment/Plan:      Diagnoses and all orders for this visit:    Menorrhagia with regular cycle  -  Patient desires to continue with Lysteda for management of heavy menstrual flow that lasts for 3-4 days  Other options, such as Motrin, Progesting only IUD or other homonal options reviewed  Patient states she will consider Mirena or Patel Perez and will call if desires to proceed with insertion  Procedure, R&B of Mirena/Liletta and indications reviewed with patient  -     Tranexamic Acid 650 MG TABS; Take 2 tablets (1,300 mg total) by mouth 3 (three) times a day for 5 days    Postop check      -   Finding of O R  reviewed with patient and informed pathology negative for endometrial polyp  Other orders  -     ibuprofen (MOTRIN) 600 mg tablet; 600 mg every 8 (eight) hours as needed          Subjective:     Patient ID: Jane Zazueta is a 43 y o  female  Patient presents for follow up after D&C, hysteroscopy done 11/4/2022  Patient states her menstrual flow was lighter post D&C  Review of Systems   Constitutional: Negative for activity change and unexpected weight change  Genitourinary: Negative for dyspareunia, genital sores, pelvic pain, vaginal bleeding, vaginal discharge and vaginal pain  Objective:     Physical Exam  Vitals and nursing note reviewed  HENT:      Head: Normocephalic  Abdominal:      General: There is no distension  Palpations: Abdomen is soft  There is no mass  Tenderness: There is no abdominal tenderness  There is no rebound  Genitourinary:     General: Normal vulva  Exam position: Lithotomy position  Labia:         Right: No rash, tenderness or lesion  Left: No rash, tenderness or lesion  Urethra: No urethral pain or urethral lesion  Vagina: Normal  No vaginal discharge  Cervix: No cervical motion tenderness, lesion or erythema        Uterus: Normal        Adnexa: Right adnexa normal and left adnexa normal       Rectum: No external hemorrhoid  Musculoskeletal:      Right lower leg: No edema  Left lower leg: No edema  Skin:     General: Skin is warm  Neurological:      Mental Status: She is alert and oriented to person, place, and time  Psychiatric:         Mood and Affect: Mood normal          Behavior: Behavior normal          Thought Content:  Thought content normal  today and correctly identified the dose she took   The INR result for today is subtherapeutic based on the INR goal 2.0-3.0.   Etiology: unable to see if INR increased after 2 days - new start  Recommended Dose:  Patient was significantly supratherapeutic on warfarin 33mg per week per chart history.  Was 3.0 on 24 mg/week .  Advised patient to take 3 mg today and then continue 1.5mg daily.  This will give her 21mg in 7 days.  Also advised to resume her regular diet to avoid chasing INRs.  She agrees to do so.     Follow-Up: 5/8/2023   Comments:   Patient appears very anxious and would rather take another medication than warfarin, but Eliquis too expensive for her.  Advised to research patient assistance program.  She states she has multiple family members that are in medicine.  She would like if we can speak with her niece.  Direct number to [employer] given to patient to give to niece who per patient is a doctor.       Counseling  Counseled about signs/symptoms of bruising/bleeding and appropriate management  Counseled about prevention and management of nosebleeds  Counseled about signs/symptoms of thrombosis and/or stroke and when to seek emergent care  Stressed importance of compliance with exact recommended dosage regimen  Stressed importance of compliance with consistent vitamin K intake  Discussed measures to reduce risk for falls and appropriate action when serious injury occurs  Strongly advised to limit/avoid alcohol consumption  Stressed importance of adherence with recommended lab monitoring  Instructed to notify clinic about medication changes or health status changes  Instructed to notify clinic about scheduled procedures  Discussed risk of thrombosis and/or bleeding with inappropriate anticoagulant use and monitoring frequency    Provided patient/caregiver with written and verbal dosing instructions, patient/caregiver verbalized understanding.

## 2025-04-09 ENCOUNTER — ANNUAL EXAM (OUTPATIENT)
Dept: OBGYN CLINIC | Facility: CLINIC | Age: 45
End: 2025-04-09
Payer: COMMERCIAL

## 2025-04-09 VITALS
WEIGHT: 194 LBS | DIASTOLIC BLOOD PRESSURE: 78 MMHG | BODY MASS INDEX: 38.09 KG/M2 | SYSTOLIC BLOOD PRESSURE: 120 MMHG | HEIGHT: 60 IN

## 2025-04-09 DIAGNOSIS — Z01.419 ENCNTR FOR GYN EXAM (GENERAL) (ROUTINE) W/O ABN FINDINGS: Primary | ICD-10-CM

## 2025-04-09 DIAGNOSIS — Z30.431 IUD CHECK UP: ICD-10-CM

## 2025-04-09 PROCEDURE — S0612 ANNUAL GYNECOLOGICAL EXAMINA: HCPCS | Performed by: OBSTETRICS & GYNECOLOGY

## 2025-04-10 NOTE — PROGRESS NOTES
Annual Wellness Visit  [facility] - [address], [address], [place] [address]    ASSESSMENT/PLAN: Radha Schroeder is a 44 y.o.  who presents for annual gynecologic exam.    Encounter for routine gynecologic examination  - Routine well woman exam completed today.  - Cervical Cancer Screening: Current ASCCP Guidelines reviewed. Last Pap: 2021. Past abnormal pap: none.  Next Pap Due: .  - STI screening offered including HIV testing: offered, pt declined  - Contraceptive counseling discussed.  Current contraception: IUD, Mirena inserted 23  - Breast Cancer Screening: Last Mammogram 2022, order provided  - The following were reviewed in today's visit: breast self exam    Additional problems addressed during this visit:  1. Encntr for gyn exam (general) (routine) w/o abn findings  2. IUD check up        -  Informed pt of IUD string visible in os    Next visit: 1 yr [place]      CC:  Annual Gynecologic Examination    HPI: Radha Schroeder is a 44 y.o.  who presents for annual gynecologic examination.  Pt states menorrhagia resolved since Mirena was inserted in 2023.  Has light spotting every month at the time of expected menses    Gynecologic Exam        Gyn History  Patient's last menstrual period was 2025 (exact date).     Last Pap: 2021 was normal    She  reports being sexually active and has had partner(s) who are male. She reports using the following methods of birth control/protection: Female Sterilization and I.U.D..       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  No date: Ovarian cyst  No date: Pain in female genitalia on intercourse     Past Surgical History:  No date:  SECTION      Comment:  X2  No date: GYNECOLOGIC CRYOSURGERY  2022: CA HYSTEROSCOPY BX ENDOMETRIUM&/POLYPC W/WO D&C; N/A      Comment:  Procedure: DILATATION AND CURETTAGE (D&C) WITH                 "HYSTEROSCOPY and Polypectomy;  Surgeon: Bess López DO;  Location:  MAIN OR;  Service: Gynecology  No date: TUBAL LIGATION     Family History   Problem Relation Age of Onset    No Known Problems Sister     No Known Problems Brother     No Known Problems Son     Multiple sclerosis Daughter     Breast cancer Maternal Aunt     No Known Problems Brother     No Known Problems Daughter     Uterine cancer Neg Hx     Ovarian cancer Neg Hx     Colon cancer Neg Hx         Social History     Tobacco Use    Smoking status: Never    Smokeless tobacco: Never   Vaping Use    Vaping status: Never Used   Substance Use Topics    Alcohol use: Yes     Comment: occasional    Drug use: Never     Comment: no          Current Outpatient Medications:     ibuprofen (MOTRIN) 600 mg tablet, 600 mg every 8 (eight) hours as needed, Disp: , Rfl:     Iron-Vitamin C 100-250 MG TABS, Take by mouth, Disp: , Rfl:     Levonorgestrel (MIRENA) 20 MCG/DAY IUD, 1 each by Intrauterine route once, Disp: , Rfl:     multivitamin (THERAGRAN) TABS, Take 1 tablet by mouth daily, Disp: , Rfl:     She has no known allergies..    ROS negative except as noted in HPI    Objective:  /78 (BP Location: Right arm, Patient Position: Sitting, Cuff Size: Standard)   Ht 5' 0.25\" (1.53 m)   Wt 88 kg (194 lb)   LMP 04/01/2025 (Exact Date) Comment: More so spotting than regular period  BMI 37.57 kg/m²      Physical Exam  Vitals and nursing note reviewed.   HENT:      Head: Normocephalic.   Chest:   Breasts:     Breasts are symmetrical.      Right: Normal. No bleeding, mass, nipple discharge, skin change or tenderness.      Left: Normal. No bleeding, mass, nipple discharge, skin change or tenderness.   Abdominal:      General: There is no distension.      Palpations: Abdomen is soft. There is no mass.      Tenderness: There is no abdominal tenderness. There is no rebound.   Genitourinary:     General: Normal vulva.      Exam position: Lithotomy " position.      Labia:         Right: No rash, tenderness or lesion.         Left: No rash, tenderness or lesion.       Urethra: No urethral pain or urethral lesion.      Vagina: Normal. No vaginal discharge.      Cervix: No discharge, friability, lesion or erythema.      Uterus: Normal.       Adnexa: Right adnexa normal and left adnexa normal.      Rectum: No external hemorrhoid.      Comments: IUD string visible in os  Musculoskeletal:      Right lower leg: No edema.      Left lower leg: No edema.   Lymphadenopathy:      Upper Body:      Right upper body: No axillary or pectoral adenopathy.      Left upper body: No axillary or pectoral adenopathy.   Skin:     General: Skin is warm.   Neurological:      Mental Status: She is alert and oriented to person, place, and time.   Psychiatric:         Mood and Affect: Mood normal.         Behavior: Behavior normal.         Thought Content: Thought content normal.

## (undated) DEVICE — TISSUE REMOVAL SYSTEM FLUID MANAGEMENT ACCESSORIES: Brand: SYMPHION

## (undated) DEVICE — ARTHROSCOPY FLOOR MAT

## (undated) DEVICE — SPONGE STICK WITH PVP-I: Brand: KENDALL

## (undated) DEVICE — MAXI PAD5.51 X 13.78 IN. (14.0 X 35.0 CM)HEAVYCONTOUREDUNSCENTED: Brand: CURITY

## (undated) DEVICE — NEEDLE SPINAL 25G X 3.5 IN QUINCKE

## (undated) DEVICE — STRL ALLENTOWN HYSTEROSCOPY PK: Brand: CARDINAL HEALTH

## (undated) DEVICE — GLOVE SRG BIOGEL 6.5

## (undated) DEVICE — SYRINGE 10ML LL CONTROL TOP